# Patient Record
Sex: MALE | Race: WHITE | NOT HISPANIC OR LATINO | Employment: FULL TIME | URBAN - METROPOLITAN AREA
[De-identification: names, ages, dates, MRNs, and addresses within clinical notes are randomized per-mention and may not be internally consistent; named-entity substitution may affect disease eponyms.]

---

## 2018-05-23 ENCOUNTER — OFFICE VISIT - RIVER FALLS (OUTPATIENT)
Dept: FAMILY MEDICINE | Facility: CLINIC | Age: 28
End: 2018-05-23

## 2018-05-23 ASSESSMENT — MIFFLIN-ST. JEOR: SCORE: 1855.76

## 2020-03-17 ENCOUNTER — OFFICE VISIT - RIVER FALLS (OUTPATIENT)
Dept: FAMILY MEDICINE | Facility: CLINIC | Age: 30
End: 2020-03-17

## 2020-05-11 ENCOUNTER — COMMUNICATION - RIVER FALLS (OUTPATIENT)
Dept: FAMILY MEDICINE | Facility: CLINIC | Age: 30
End: 2020-05-11

## 2021-02-10 ENCOUNTER — OFFICE VISIT - RIVER FALLS (OUTPATIENT)
Dept: FAMILY MEDICINE | Facility: CLINIC | Age: 31
End: 2021-02-10

## 2021-04-01 ENCOUNTER — AMBULATORY - RIVER FALLS (OUTPATIENT)
Dept: FAMILY MEDICINE | Facility: CLINIC | Age: 31
End: 2021-04-01

## 2021-04-29 ENCOUNTER — AMBULATORY - RIVER FALLS (OUTPATIENT)
Dept: FAMILY MEDICINE | Facility: CLINIC | Age: 31
End: 2021-04-29

## 2021-11-02 ENCOUNTER — AMBULATORY - RIVER FALLS (OUTPATIENT)
Dept: FAMILY MEDICINE | Facility: CLINIC | Age: 31
End: 2021-11-02

## 2022-02-12 VITALS
RESPIRATION RATE: 16 BRPM | OXYGEN SATURATION: 98 % | HEIGHT: 70 IN | DIASTOLIC BLOOD PRESSURE: 60 MMHG | BODY MASS INDEX: 27.92 KG/M2 | SYSTOLIC BLOOD PRESSURE: 136 MMHG | WEIGHT: 195 LBS | TEMPERATURE: 98.1 F | HEART RATE: 60 BPM

## 2022-02-15 NOTE — NURSING NOTE
Asthma Control Test (ACT) Total Entered On:  2/10/2021 4:48 PM CST    Performed On:  2/10/2021 4:48 PM CST by Angela Agustin               Asthma Control Test (ACT) Total   Asthma Control Test Total (Adult) :   20    ACT- Asthma Action Plan Interventions :   Medications Explained, Exacerbation Information, Flu Shot   Angela Agustin - 2/10/2021 4:48 PM CST

## 2022-02-15 NOTE — NURSING NOTE
Comprehensive Intake Entered On:  2/10/2021 4:12 PM CST    Performed On:  2/10/2021 4:07 PM CST by Angela Agustin               Summary   Chief Complaint :   Asthma check up.  Verbal consent for video visit given.    Angela Agustin - 2/10/2021 4:07 PM CST   Health Status   Allergies Verified? :   Yes   Medication History Verified? :   Yes   Pre-Visit Planning Status :   Completed   Tobacco Use? :   Never smoker   Hospitalized since last visit? :   No   Inpatient? :   No   ED? :   No   Related to Condition :   Asthma   Angela Agustin - 2/10/2021 4:07 PM CST   ID Risk Screen   Recent Travel History :   No recent travel   Family Member Travel History :   No recent travel   Other Exposure to Infectious Disease :   Unknown   COVID-19 Testing Status :   No positive COVID-19 test   Angela Agustin - 2/10/2021 4:07 PM CST   Social History   Social History   (As Of: 2/10/2021 4:12:13 PM CST)   Alcohol:        Current, 2-3 times per week   Comments:  5/23/2018 6:40 PM - Kosta Triplett CMA: 1-3 drinks per time   (Last Updated: 5/23/2018 6:40:11 PM CDT by Kosta Triplett CMA)          Tobacco:        Never (less than 100 in lifetime)   (Last Updated: 2/10/2021 4:07:36 PM CST by Angela Agustin)          Electronic Cigarette/Vaping:        Electronic Cigarette Use: Never.   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   (Last Updated: 12/26/2016 11:24:12 AM CST by Ghulam Rogers MD)

## 2022-02-15 NOTE — TELEPHONE ENCOUNTER
---------------------  From: Christiana Arriaga LPN (eRx Pool (32224_[facility]))   To: SILKE Message Pool (32224_[facility]);     Sent: 5/11/2020 12:25:24 PM CDT  Subject: FW: Medication Management   Due Date/Time: 5/10/2020 9:32:00 AM CDT     Med Refill    Date of last office visit and reason: 03/17/20, telemed    Impression and Plan   Diagnosis     Mild persistent asthma (UIS85-GC J45.30).     Plan:  will treat with burst of prednisone to reduce inflammation, and will restart him on Advair and will renew his albuterol.      Date of last Med Check or Px:  05/23/18  Date of last labs pertaining to med:  ACT 05/23/18, Due    RTC order in chart:  _    For protocol refill, has patient been contacted?  _          ** Patient matched by Christiana Arriaga LPN on 5/11/2020 11:55:32 AM CDT **      ------------------------------------------  From: [facility] #[id number]  To: Amadou Montalvo PA-C  Sent: May 9, 2020 9:32:03 AM CDT  Subject: Medication Management  Due: May 10, 2020 9:32:03 AM CDT    ** On Hold Pending Signature **  Drug: albuterol (albuterol 90 mcg/inh inhalation aerosol)  INHALE 2 PUFFS BY MOUTH EVERY 4 TO 6 HOURS AS NEEDED FOR WHEEZING  Quantity: 8.5 gm  Days Supply: 16  Refills: 0  Substitutions Allowed  Notes from Pharmacy:     Dispensed Drug: albuterol (albuterol 90 mcg/inh inhalation aerosol)  INHALE 2 PUFFS BY MOUTH EVERY 4 TO 6 HOURS AS NEEDED FOR WHEEZING  Quantity: 8.5 gm  Days Supply: 16  Refills: 0  Substitutions Allowed  Notes from Pharmacy:   ---------------------------------------------------------------  From: Kosta Triplett CMA ([facility] Message Pool (32224_[facility]))   To: Amadou Montalvo PA-C;     Sent: 5/11/2020 12:39:17 PM CDT  Subject: FW: Medication Management   Due Date/Time: 5/10/2020 9:32:00 AM CDT---------------------  From: Amadou Montalvo PA-C   To: [facility] #[id number]    Sent: 5/11/2020 12:50:41 PM CDT  Subject: FW: Medication Management     ** Submitted:  **  Complete:albuterol (Ventolin HFA 90 mcg/inh inhalation aerosol)   Signed by Amadou Montalvo PA-C  5/11/2020 5:50:00 PM    ** Approved with modifications: **  albuterol (ALBUTEROL HFA INH (200 PUFFS) 8.5GM)  INHALE 2 PUFFS BY MOUTH EVERY 4 TO 6 HOURS AS NEEDED FOR WHEEZING  Qty:  8.5 gm        Days Supply:  16        Refills:  3          Substitutions Allowed     Route To Pharmacy - [facility] #[id number]

## 2022-02-15 NOTE — PROGRESS NOTES
Patient:   DORIE BURKS            MRN: [id number]            FIN: [id number]               Age:   28 years     Sex:  Male     :  1990   Associated Diagnoses:   Mild persistent asthma; Peanut allergy   Author:   Amadou Montalvo PA-C      Chief Complaint   2018 5:52 PM CDT    Pt here needing refills on his inhalers and epi pens        History of Present Illness   Chief complaint and symptoms noted above and confirmed with patient   he uses his albuterol inhaler maybe once every 2 weeks, except if he is around cats or increased pollen and   he may have to use it more  he has been on Asmanex in the past, but he had better success with Advair  ACT score is 20 today  he also has a peanut allergy and needs an epi pen         Review of Systems   Constitutional:  Negative.    Ear/Nose/Mouth/Throat:  Negative.    Respiratory:  Negative.    Gastrointestinal:  Negative.       Health Status   Allergies:    Allergic Reactions (Selected)  Severity Not Documented  Peanuts (Anaphylaxis)   Medications:  (Selected)   Prescriptions  Prescribed  EpiPen Auto-Injector 0.3 mg injectable kit: See Instructions, Instructions: USE AS DIRECTED FOR ANAPHYLAXIS  may repeat if necessary, # 2 EA, 1 Refill(s), Pharmacy: On license of [facility], USE AS DIRECTED FOR ANAPHYLAXIS; may repeat if necessary  Ventolin HFA 90 mcg/inh inhalation aerosol: 2 puff(s), inh, qid, Instructions: INHALE TWO PUFFS BY MOUTH EVERY 4 TO 6 HOURS AS NEEDED FOR WHEEZING, # 1 EA, 0 Refill(s), Type: Maintenance, Pharmacy: On license of [facility]   Problem list:    All Problems  Peanut allergy / SNOMED CT 399371949 / Confirmed  Asthma / SNOMED CT 720432648 / Confirmed      Histories   Past Medical History:    Active  Asthma ([id number])   Family History:    Asthma  Mother (Matilda)  Diabetes mellitus type I  Father (Chintan)  CA - Cancer of colon  Grandfather (P)  Miscellaneous  Grandmother (M) (unknown)  Comments:  2014 3:38 PM Owen Sanchez  Naheed  Anesthetic complications.     Procedure history:    Extraction of wisdom tooth ([id number]).      Physical Examination   Vital Signs   5/23/2018 5:52 PM CDT Temperature Tympanic 98.1 DegF    Peripheral Pulse Rate 60 bpm    Pulse Site Radial artery    Respiratory Rate 16 br/min    Systolic Blood Pressure 136 mmHg  HI    Diastolic Blood Pressure 60 mmHg    Mean Arterial Pressure 85 mmHg    BP Site Right arm    Oxygen Saturation 98 %      Measurements from flowsheet : Measurements   5/23/2018 5:52 PM CDT Height Measured - Standard 70 in    Weight Measured - Standard 195 lb    BSA 2.09 m2    Body Mass Index 27.98 kg/m2  HI      General:  No acute distress.    HENT:  Tympanic membranes are clear, No pharyngeal erythema, No sinus tenderness, nares are patent.    Neck:  Supple, Non-tender, No lymphadenopathy.    Respiratory:  Lungs are clear to auscultation.    Cardiovascular:  Normal rate, Regular rhythm, No murmur.    Psychiatric:  Appropriate mood & affect.       Impression and Plan   Diagnosis     Mild persistent asthma (DTV78-MT J45.30).     Peanut allergy (AOP29-MV Z91.010).     Orders     Orders   Pharmacy:  Asmanex Twisthaler 30 Dose 110 mcg/inh inhalation aerosol powder (Delete).     Orders   Pharmacy:  Advair Diskus 250 mcg-50 mcg inhalation powder (Prescribe): 1 puff(s), INH, BID, # 60 EA, 11 Refill(s), Type: Maintenance, Pharmacy: [facility], 1 puff(s) inh bid.     Orders   Pharmacy:  EPINEPHrine 0.3 mg injectable kit (Prescribe): 0.3 mL, im, once, # 1 kit(s), 0 Refill(s), Type: Soft Stop, Pharmacy: [facility], 0.3 mL im once  Ventolin HFA 90 mcg/inh inhalation aerosol (Prescribe): 2 puff(s), inh, qid, Instructions: INHALE TWO PUFFS BY MOUTH EVERY 4 TO 6 HOURS AS NEEDED FOR WHEEZING, # 2 EA, 8 Refill(s), Type: Maintenance, Pharmacy: [facility], 2 puff(s) inh qid,Instr:INHALE TWO PUFFS BY MOUTH EVERY 4 TO 6 HOURS AS NEEDED FOR  WHEEZING  Ventolin HFA 90 mcg/inh inhalation aerosol (Modify): 2 puff(s), inh, qid, Instructions: INHALE TWO PUFFS BY MOUTH EVERY 4 TO 6 HOURS AS NEEDED FOR WHEEZING, # 1 EA, 0 Refill(s), Type: Hard Stop, Pharmacy: [facility].     Orders   Charges (Evaluation and Management):  92774 office outpatient visit 15 minutes (Charge) (Order): Quantity: 1, Mild persistent asthma  Peanut allergy.

## 2022-02-15 NOTE — TELEPHONE ENCOUNTER
Entered by Kosta Triplett CMA on October 21, 2020 4:53:55 PM CDT  ---------------------  From: Kosta Triplett CMA   To: [facility] Rx [id number]    Sent: 10/21/2020 4:53:54 PM CDT  Subject: Medication Management     ** Submitted: **  Complete:albuterol (albuterol 90 mcg/inh inhalation aerosol)   Signed by Kosta Triplett CMA  10/21/2020 9:53:00 PM [facility]    ** Approved with modifications: **  albuterol (ALBUTEROL HFA INH(200 PUFFS)18GM)  INHALE 2 PUFFS BY MOUTH EVERY 4 TO 6 HOURS AS NEEDED FOR WHEEZING  Qty:  18 gm        Days Supply:  16        Refills:  1          Substitutions Allowed     Route To Pharmacy - [facility] Rx [id number]   Signed by Kosta Triplett CMA            ------------------------------------------  From: [facility] Rx [id number]  To: Amadou Montalvo PA-C  Sent: October 21, 2020 12:55:52 PM CDT  Subject: Medication Management  Due: October 8, 2020 2:04:31 PM CDT     ** On Hold Pending Signature **     Dispensed Drug: albuterol (albuterol 90 mcg/inh inhalation aerosol), INHALE 2 PUFFS BY MOUTH EVERY 4 TO 6 HOURS AS NEEDED FOR WHEEZING  Quantity: 18 gm  Days Supply: 16  Refills: 0  Substitutions Allowed  Notes from Pharmacy:  ------------------------------------------Med Refill      Date of last office visit and reason:  3/17/20 asthma telemedicine      Date of last Med Check / Px:     Date of last labs pertaining to med:      Note:  Rx filled per protocol.  Kosta Triplett CMA     RTC order in chart:  no    For Protocol refill, has patient been contacted:  _

## 2022-02-15 NOTE — TELEPHONE ENCOUNTER
Entered by Cristela Tolentino CMA on December 07, 2020 10:29:42 AM CST  ---------------------  From: Cristela Tolentino CMA   To: [facility] Rx [id number]    Sent: 12/7/2020 10:29:42 AM CST  Subject: Medication Management     ** Submitted: **  Order:albuterol (Albuterol (Eqv-ProAir HFA) 90 mcg/inh inhalation aerosol)  2 puff(s)  Inhale  q6 hrs  Qty:  1 EA        Refills:  0          Substitutions Allowed     PRN  for shortness of breath or wheezing      Route To Pharmacy [facility] Rx [id number]    Signed by Cristela Tolentino CMA  12/7/2020 4:28:00 PM [facility]    ** Submitted: **  Complete:albuterol (albuterol 90 mcg/inh inhalation aerosol)   Signed by Cristela Tolentino CMA  12/7/2020 4:29:00 PM [facility]    ** Not Approved:  **  albuterol (ALBUTEROL HFA INH (200 PUFFS)8.5GM)  INHALE 2 PUFFS BY MOUTH EVERY 4 TO 6 HOURS AS NEEDED FOR WHEEZING  Qty:  8.5 gm        Days Supply:  16        Refills:  0          Substitutions Allowed     Route To [facility] Rx [id number]   Signed by Cristela Tolentino CMA            ------------------------------------------  From: [facility] Rx [id number]  To: Amadou Montalvo PA-C  Sent: December 3, 2020 9:58:49 AM CST  Subject: Medication Management  Due: December 2, 2020 4:43:08 PM CST     ** On Hold Pending Signature **     Dispensed Drug: albuterol (Albuterol (Eqv-ProAir HFA) 90 mcg/inh inhalation aerosol), INHALE 2 PUFFS BY MOUTH EVERY 4 TO 6 HOURS AS NEEDED FOR WHEEZING  Quantity: 8.5 gm  Days Supply: 16  Refills: 0  Substitutions Allowed  Notes from Pharmacy:  ------------------------------------------RTC placed for asthma f/u - last done 3/2020 - due for ACT. Refilled per protocol. Note also sent to pharmacy.

## 2022-02-15 NOTE — TELEPHONE ENCOUNTER
Entered by Sydnee Peterson on December 30, 2021 1:11:42 PM CST  ---------------------  From: Sydnee Peterson   To: [email or web address] #[id number]    Sent: 12/30/2021 1:11:41 PM CST  Subject: Medication Management     ** Submitted: **  Order:albuterol (Albuterol (Eqv-Proventil HFA) 90 mcg/inh inhalation aerosol)  See Instructions  INHALE 2 PUFFS BY MOUTH EVERY 6 HOURS AS NEEDED FOR SHORTNESS OF BREATH OR WHEEZING  Qty:  6.7 gm        Days Supply:  25        Refills:  2          Substitutions Allowed     Route To [email or web address] #[email or web address]    Signed by Sydnee Peterson  12/30/2021 7:10:00 PM [facility]    ** Submitted: **  Complete:albuterol (Albuterol (Eqv-Proventil HFA) 90 mcg/inh inhalation aerosol)   Signed by Sydnee Peterson  12/30/2021 7:11:00 PM [facility]    ** Not Approved:  **  albuterol (ALBUTEROL HFA INH (200 PUFFS)6.7GM)  INHALE 2 PUFFS BY MOUTH EVERY 6 HOURS AS NEEDED FOR SHORTNESS OF BREATH OR WHEEZING  Qty:  6.7 gm        Days Supply:  25        Refills:  0          Substitutions Allowed     Route To [email or web address] #[email or web address]   Signed by Sydnee Peterson          due for visit 2/2022, will give 3 EA until then.       ------------------------------------------  From: [email or web address] #[email or web address]  To: Chaz Chavez MD  Sent: December 27, 2021 7:32:45 PM CST  Subject: Medication Management  Due: December 15, 2021 8:09:06 PM CST     ** On Hold Pending Signature **     Dispensed Drug: albuterol (Albuterol (Eqv-Proventil HFA) 90 mcg/inh inhalation aerosol), INHALE 2 PUFFS BY MOUTH EVERY 6 HOURS AS NEEDED FOR SHORTNESS OF BREATH OR WHEEZING  Quantity: 6.7 gm  Days Supply: 25  Refills: 0  Substitutions Allowed  Notes from Pharmacy:  ------------------------------------------

## 2022-02-15 NOTE — LETTER
(Inserted Image. Unable to display)     January 08, 2021      DORIE BURKS  [address] [place] [address]          Dear DORIE,      Thank you for selecting [facility] (previously [facility]) for your healthcare needs.    Our records indicate you are due for the following services:     Asthma Follow-up Office Visit ~ Please bring in your inhalers/asthma medications that you are currently using to your visit.     (FYI   Regarding office visits: In some instances, a video visit or telephone visit may be offered as an option.)      To schedule an appointment or if you have further questions, please contact your clinic at [phone].      Powered by [employer]    Sincerely,    Amadou Montalvo PA-C

## 2022-02-15 NOTE — PROGRESS NOTES
Patient:   DORIE BURKS            MRN: [id number]            FIN: [id number]               Age:   29 years     Sex:  Male     :  1990   Associated Diagnoses:   Mild persistent asthma   Author:   Selvin HERNANDEZ, Amadou VERAS      Visit Information   Visit type:  Telephone Encounter.    Source of history:  Patient.    Location of patient:  home_  Call Start Time:   10:15_  Call End Time:    _10:25      Chief Complaint   worsening asthma since   only on albuterol now, had been on advair in the past bu t ran out in Dec      _      History of Present Illness   Today's visit was conducted via telephone due to the COVID-19 pandemic.     Reason for visit:  worsening asthma since   only on albuterol now, had been on advair in the past_  has been using his albuterol 2-3x/day, some wheezing and SOB, no cough, no recent travel      Review of Systems   Constitutional:  Negative.    Ear/Nose/Mouth/Throat:  No nasal congestion.    Respiratory:  Shortness of breath, Wheezing, No cough.    Gastrointestinal:  Negative.       Impression and Plan   Diagnosis     Mild persistent asthma (WGV06-RZ J45.30).     Plan:  will treat with burst of prednisone to reduce inflammation, and will restart him on Advair and will renew his albuterol.    Orders     Orders   Pharmacy:  predniSONE 20 mg oral tablet (Prescribe): = 2 tab(s) ( 40 mg ), Oral, daily, x 5 day(s), Instructions: for asthma flare  with food or milk, # 10 tab(s), 1 Refill(s), Type: Acute, Pharmacy: [facility], 2 tab(s) Oral daily,x5 day(s),Instr:for asthma flare; with food or milk  Ventolin HFA 90 mcg/inh inhalation aerosol (Prescribe): 2 puff(s), inh, qid, Instructions: INHALE TWO PUFFS BY MOUTH EVERY 4 TO 6 HOURS AS NEEDED FOR WHEEZING, # 2 EA, 8 Refill(s), Type: Maintenance, Pharmacy: [facility], 2 puff(s) Inhale qid,Instr:INHALE TWO PUFFS BY MOUTH EVERY 4 TO 6 HOURS AS NEEDED FOR WHEEZING  Advair Diskus 250 mcg-50 mcg  inhalation powder (Prescribe): 1 puff(s), INH, BID, # 3 EA, 3 Refill(s), Type: Maintenance, Pharmacy: [facility], 1 puff(s) Inhale bid  Ventolin HFA 90 mcg/inh inhalation aerosol (Modify): = 2 puff(s), inh, qid, Instructions: INHALE TWO PUFFS BY MOUTH EVERY 4 TO 6 HOURS AS NEEDED FOR WHEEZING, # 2 EA, 8 Refill(s), Type: Hard Stop, Pharmacy: [facility]  Advair Diskus 250 mcg-50 mcg inhalation powder (Modify): 1 puff(s), INH, BID, # 60 EA, 11 Refill(s), Type: Hard Stop, Pharmacy: [facility].     Orders   Charges (Evaluation and Management):  [id number] physician telephone evaluation 5-10 min (Charge) (Order): Quantity: 1, Mild persistent asthma.        Health Status   Allergies:    Allergic Reactions (Selected)  Severity Not Documented  Peanuts (Anaphylaxis)   Medications:  (Selected)   Prescriptions  Prescribed  Advair Diskus 250 mcg-50 mcg inhalation powder: 1 puff(s), INH, BID, # 60 EA, 11 Refill(s), Type: Maintenance, Pharmacy: [facility], 1 puff(s) inh bid  EPINEPHrine 0.3 mg injectable kit: 0.3 mL, im, once, # 1 kit(s), 0 Refill(s), Type: Soft Stop, Pharmacy: [facility], 0.3 mL im once  EpiPen Auto-Injector 0.3 mg injectable kit: See Instructions, Instructions: USE AS DIRECTED FOR ANAPHYLAXIS  may repeat if necessary, # 2 EA, 1 Refill(s), Pharmacy: [facility], USE AS DIRECTED FOR ANAPHYLAXIS; may repeat if necessary  Ventolin HFA 90 mcg/inh inhalation aerosol: 2 puff(s), inh, qid, Instructions: INHALE TWO PUFFS BY MOUTH EVERY 4 TO 6 HOURS AS NEEDED FOR WHEEZING, # 2 EA, 8 Refill(s), Type: Maintenance, Pharmacy: [facility], 2 puff(s) inh qid,Instr:INHALE TWO PUFFS BY MOUTH EVERY 4 TO...   Problem list:    All Problems  Peanut allergy / SNOMED CT 530587481 / Confirmed  Mild persistent asthma / SNOMED CT 5335749824 / Confirmed  Asthma / SNOMED CT  322204925 / Confirmed      Histories   Past Medical History:    Active  Asthma ([id number])   Family History:    Asthma  Mother (Matilda)  Diabetes mellitus type I  Father (Chintan)  CA - Cancer of colon  Grandfather (P)  Miscellaneous  Grandmother (M) (unknown)  Comments:  5/13/2014 3:38 PM CDT - Naheed Sanchez  Anesthetic complications.     Procedure history:    Extraction of wisdom tooth ([id number]).   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

## 2022-02-15 NOTE — TELEPHONE ENCOUNTER
Entered by Edy Hammer on September 23, 2019 7:43:10 PM CDT  ---------------------  From: Edy Hammer   To: [facility]    Sent: 9/23/2019 7:43:10 PM CDT  Subject: Medication Management     ** Not Approved: Patient needs appointment, Has not been seen over a year. Needs appointment prior. Thanks **  fluticasone-salmeterol (ADVAIR DISKUS 250-50MCG/DOSE AEPB)  INHALE ONE PUFF BY MOUTH TWICE A DAY  Qty:  60 unknown unit        Days Supply:  30        Refills:  11          Substitutions Allowed     Route To Pharmacy - [facility]   Signed by Edy Hammer            ------------------------------------------  From: [facility]  To: Amadou Montalvo PA-C  Sent: September 21, 2019 12:32:06 PM CDT  Subject: Medication Management  Due: September 22, 2019 12:32:06 PM CDT    ** On Hold Pending Signature **  Drug: fluticasone-salmeterol (Advair Diskus 250 mcg-50 mcg inhalation powder)  INHALE ONE PUFF BY MOUTH TWICE A DAY  Quantity: 60 unknown unit  Days Supply: 30        Refills: 11  Substitutions Allowed  Notes from Pharmacy:     Dispensed Drug: fluticasone-salmeterol (Advair Diskus 250 mcg-50 mcg inhalation powder)  INHALE ONE PUFF BY MOUTH TWICE A DAY  Quantity: 60 unknown unit  Days Supply: 30        Refills: 11  Substitutions Allowed  Notes from Pharmacy:   ------------------------------------------Denied request. Has not been seen since 5/2018.

## 2022-02-15 NOTE — TELEPHONE ENCOUNTER
Entered by Lilly Preston RN on July 29, 2019 4:14:42 PM CDT  ---------------------  From: Lilly Preston RN   To: [facility]    Sent: 7/29/2019 4:14:42 PM CDT  Subject: Medication Management     ** Not Approved: Patient needs appointment, Needs office visit **  albuterol (PROAIR HFA 108MCG/ACT AERS)  INHALE TWO PUFFS BY MOUTH EVERY 4 TO 6 HOURS AS NEEDED FOR WHEEZING  Qty:  17 gm        Days Supply:  1        Refills:  8          Substitutions Allowed     Route To Pharmacy - [facility]   Signed by Lilly Preston RN            ------------------------------------------  From: [facility]  To: Amadou Montalvo PA-C  Sent: July 27, 2019 9:51:25 AM CDT  Subject: Medication Management  Due: July 28, 2019 9:51:25 AM CDT    ** On Hold Pending Signature **  Drug: albuterol (Ventolin HFA 90 mcg/inh inhalation aerosol)  INHALE TWO PUFFS BY MOUTH EVERY 4 TO 6 HOURS AS NEEDED FOR WHEEZING  Quantity: 17 gm       Days Supply: 1         Refills: 8  Substitutions Allowed  Notes from Pharmacy:     Dispensed Drug: albuterol (ProAir HFA 90 mcg/inh inhalation aerosol)  INHALE TWO PUFFS BY MOUTH EVERY 4 TO 6 HOURS AS NEEDED FOR WHEEZING  Quantity: 17 gm       Days Supply: 1         Refills: 8  Substitutions Allowed  Notes from Pharmacy:   ------------------------------------------Patient has not been seen in over a year by any provider.  Needs office visit.

## 2022-02-15 NOTE — TELEPHONE ENCOUNTER
Entered by Selene Pittman CMA on October 27, 2021 7:22:56 AM CDT  ---------------------  From: Selene Pittman CMA   To: [facility] #[id number]    Sent: 10/27/2021 7:22:56 AM CDT  Subject: Medication Management     ** Submitted: **  Order:albuterol (Albuterol (Eqv-Proventil HFA) 90 mcg/inh inhalation aerosol)  See Instructions  INHALE 2 PUFFS BY MOUTH EVERY 6 HOURS AS NEEDED FOR SHORTNESS OF BREATH OR WHEEZING  Qty:  3 EA        Refills:  0          Substitutions Allowed     Route To [facility] #[id number]    Signed by Selene Pittman CMA  10/27/2021 12:22:00 PM [facility]    ** Submitted: **  Complete:albuterol (Albuterol (Eqv-ProAir HFA) 90 mcg/inh inhalation aerosol)   Signed by Selene Pittman CMA  10/27/2021 12:22:00 PM [facility]    ** Not Approved:  **  albuterol (ALBUTEROL HFA INH (200 PUFFS)6.7GM)  INHALE 2 PUFFS BY MOUTH EVERY 6 HOURS AS NEEDED FOR SHORTNESS OF BREATH OR WHEEZING  Qty:  6.7 gm        Days Supply:  25        Refills:  0          Substitutions Allowed     Route To [facility] #[id number]   Signed by Selene Pittman CMA            ------------------------------------------  From: [facility] #[id number]  To: Chaz Chavez MD  Sent: October 24, 2021 4:44:55 PM CDT  Subject: Medication Management  Due: October 21, 2021 8:18:07 PM CDT     ** On Hold Pending Signature **     Dispensed Drug: albuterol (Albuterol (Eqv-Proventil HFA) 90 mcg/inh inhalation aerosol), INHALE 2 PUFFS BY MOUTH EVERY 6 HOURS AS NEEDED FOR SHORTNESS OF BREATH OR WHEEZING  Quantity: 6.7 gm  Days Supply: 25  Refills: 0  Substitutions Allowed  Notes from Pharmacy:  ------------------------------------------RTC February for asthma med check

## 2022-04-15 RX ORDER — ALBUTEROL SULFATE 90 UG/1
2 AEROSOL, METERED RESPIRATORY (INHALATION) EVERY 6 HOURS PRN
Qty: 6.7 G | Refills: 0 | OUTPATIENT
Start: 2022-04-15

## 2022-04-20 RX ORDER — EPINEPHRINE 0.3 MG/.3ML
0.3 INJECTION SUBCUTANEOUS ONCE
COMMUNITY
Start: 2021-02-10

## 2022-04-20 RX ORDER — FLUTICASONE PROPIONATE AND SALMETEROL 250; 50 UG/1; UG/1
1 POWDER RESPIRATORY (INHALATION) 2 TIMES DAILY
COMMUNITY
Start: 2021-02-10 | End: 2022-04-25

## 2022-04-20 RX ORDER — ALBUTEROL SULFATE 90 UG/1
2 AEROSOL, METERED RESPIRATORY (INHALATION) EVERY 6 HOURS PRN
COMMUNITY
Start: 2021-12-30 | End: 2024-04-24

## 2022-04-22 ENCOUNTER — OFFICE VISIT (OUTPATIENT)
Dept: FAMILY MEDICINE | Facility: CLINIC | Age: 32
End: 2022-04-22
Payer: COMMERCIAL

## 2022-04-22 VITALS
BODY MASS INDEX: 27.6 KG/M2 | SYSTOLIC BLOOD PRESSURE: 120 MMHG | HEIGHT: 70 IN | OXYGEN SATURATION: 98 % | WEIGHT: 192.8 LBS | HEART RATE: 64 BPM | DIASTOLIC BLOOD PRESSURE: 80 MMHG | TEMPERATURE: 97.7 F

## 2022-04-22 DIAGNOSIS — Z91.010 PEANUT ALLERGY: ICD-10-CM

## 2022-04-22 DIAGNOSIS — H01.004 BLEPHARITIS OF LEFT UPPER EYELID, UNSPECIFIED TYPE: ICD-10-CM

## 2022-04-22 DIAGNOSIS — J45.30 MILD PERSISTENT ASTHMA WITHOUT COMPLICATION: ICD-10-CM

## 2022-04-22 DIAGNOSIS — T78.40XA ALLERGY, INITIAL ENCOUNTER: Primary | ICD-10-CM

## 2022-04-22 PROCEDURE — 99213 OFFICE O/P EST LOW 20 MIN: CPT | Performed by: FAMILY MEDICINE

## 2022-04-22 RX ORDER — BACITRACIN 500 [USP'U]/G
0.25 OINTMENT OPHTHALMIC 3 TIMES DAILY
Qty: 3.5 G | Refills: 1 | Status: SHIPPED | OUTPATIENT
Start: 2022-04-22 | End: 2023-11-30

## 2022-04-22 NOTE — PROGRESS NOTES
"  Assessment & Plan     Allergy, initial encounter  Patient with multiple allergies we will get him set up with allergist for skin testing and possible desensitization shots  - Adult Allergy/Asthma Referral; Future    Mild persistent asthma without complication  Overall doing well Advair once daily usually most of the year occasionally twice daily rare albuterol use mostly when he is around cats    Peanut allergy  No issues for several years he does have an EpiPen    Blepharitis of left upper eyelid, unspecified type  Discussed treatment options will look at bacitracin liquid or topical steroids look at warm compresses handout given               BMI:   Estimated body mass index is 27.66 kg/m  as calculated from the following:    Height as of this encounter: 1.778 m (5' 10\").    Weight as of this encounter: 87.5 kg (192 lb 12.8 oz).           Return in about 1 year (around 4/22/2023) for Follow up.    Chaz Chavez MD  [facility]    Victor Manuel Bateman is a 32 year old who presents for the following health issues   Patient overall is been doing well.  Advair daily rare use of albuterol primarily around cats no contact with any nuts.  He has had some chronic issues with his left upper eyelid for several weeks and gets a little red and swollen at times.  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  He plays hockey and works out.  His girlfriend is training for a half marathon and he runs with her.  History of Present Illness     Asthma:  He presents for follow up of asthma.  He has no cough, no wheezing, and no shortness of breath. He is using a relief medication a few times a week. He typically misses taking his controller medication 2 time(s) per week.Patient is aware of the following triggers: animal dander, cold air and exercise or sports. The patient has not had a visit to the Emergency Room, Urgent Care or Hospital due to asthma since the last clinic visit. " "    He eats 2-3 servings of fruits and vegetables daily.He consumes 1 sweetened beverage(s) daily.He exercises with enough effort to increase his heart rate 30 to 60 minutes per day.  He exercises with enough effort to increase his heart rate 4 days per week.   He is taking medications regularly.         Current Outpatient Medications   Medication     albuterol (PROAIR HFA/PROVENTIL HFA/VENTOLIN HFA) 108 (90 Base) MCG/ACT inhaler     EPINEPHrine (ANY BX GENERIC EQUIV) 0.3 MG/0.3ML injection 2-pack     fluticasone-salmeterol (ADVAIR) 250-50 MCG/DOSE inhaler     No current facility-administered medications for this visit.           Review of Systems   Constitutional, HEENT, cardiovascular, pulmonary, gi and gu systems are negative, except as otherwise noted.      Objective    /80 (BP Location: Right arm)   Pulse 64   Temp 97.7  F (36.5  C) (Tympanic)   Ht 1.778 m (5' 10\")   Wt 87.5 kg (192 lb 12.8 oz)   SpO2 98%   BMI 27.66 kg/m    Body mass index is 27.66 kg/m .  Physical Exam   GENERAL: healthy, alert and no distress  EYES: Blepharitis left upper lid  NECK: no adenopathy, no asymmetry, masses, or scars and thyroid normal to palpation  RESP: lungs clear to auscultation - no rales, rhonchi or wheezes  CV: regular rate and rhythm, normal S1 S2, no S3 or S4, no murmur, click or rub, no peripheral edema and peripheral pulses strong  ABDOMEN: soft, nontender, no hepatosplenomegaly, no masses and bowel sounds normal  MS: no gross musculoskeletal defects noted, no edema                "

## 2022-04-25 DIAGNOSIS — J45.30 MILD PERSISTENT ASTHMA WITHOUT COMPLICATION: Primary | ICD-10-CM

## 2022-04-25 RX ORDER — FLUTICASONE PROPIONATE AND SALMETEROL 250; 50 UG/1; UG/1
1 POWDER RESPIRATORY (INHALATION) 2 TIMES DAILY
Qty: 3 EACH | Refills: 3 | Status: SHIPPED | OUTPATIENT
Start: 2022-04-25 | End: 2024-04-25

## 2022-04-26 ENCOUNTER — TELEPHONE (OUTPATIENT)
Dept: FAMILY MEDICINE | Facility: CLINIC | Age: 32
End: 2022-04-26
Payer: COMMERCIAL

## 2022-04-26 DIAGNOSIS — J45.30 MILD PERSISTENT ASTHMA WITHOUT COMPLICATION: Primary | ICD-10-CM

## 2022-04-26 RX ORDER — ALBUTEROL SULFATE 90 UG/1
2 AEROSOL, METERED RESPIRATORY (INHALATION) EVERY 6 HOURS
Qty: 18 G | Refills: 3 | Status: SHIPPED | OUTPATIENT
Start: 2022-04-26 | End: 2022-10-26

## 2022-04-26 NOTE — TELEPHONE ENCOUNTER
Prescription approved per [facility] Refill Protocol.  Last Written Prescription Date: 12/30/21  Last Fill Quantity: 6.7gm,  # refills: 2   Last office visit: 4/22/2022 with prescribing provider for allergy, asthma TFS

## 2022-04-26 NOTE — TELEPHONE ENCOUNTER
Reason for Call:  Medication or medication refill:    Do you use a [facility]?  Name of the pharmacy and phone number for the current request:  [facility] on [address]    Name of the medication requested: [id number]     Other request: pharmacy never received please resend. Cost of advair is very pricey per patient. Insurance is requesting it be filled at a 90 day supply.     Can we leave a detailed message on this number? YES    Phone number patient can be reached at: Cell number on file:    Telephone Information:   Mobile [phone]       Best Time: anytime    Call taken on 4/26/2022 at 10:26 AM by Marcie Marcum

## 2022-04-27 RX ORDER — ERYTHROMYCIN 5 MG/G
0.5 OINTMENT OPHTHALMIC 2 TIMES DAILY
Qty: 3.5 G | Refills: 1 | Status: SHIPPED | OUTPATIENT
Start: 2022-04-27 | End: 2022-05-11

## 2022-07-14 ENCOUNTER — OFFICE VISIT (OUTPATIENT)
Dept: ALLERGY | Facility: CLINIC | Age: 32
End: 2022-07-14
Attending: FAMILY MEDICINE
Payer: COMMERCIAL

## 2022-07-14 VITALS — HEART RATE: 75 BPM | OXYGEN SATURATION: 97 % | BODY MASS INDEX: 27.66 KG/M2 | RESPIRATION RATE: 16 BRPM | HEIGHT: 70 IN

## 2022-07-14 DIAGNOSIS — J30.89 ALLERGIC RHINITIS DUE TO DUST MITE: ICD-10-CM

## 2022-07-14 DIAGNOSIS — J30.81 ALLERGIC RHINITIS DUE TO ANIMALS: ICD-10-CM

## 2022-07-14 DIAGNOSIS — Z91.018 TREE NUT ALLERGY: ICD-10-CM

## 2022-07-14 DIAGNOSIS — J30.1 SEASONAL ALLERGIC RHINITIS DUE TO POLLEN: Primary | ICD-10-CM

## 2022-07-14 DIAGNOSIS — T78.40XA ALLERGY, INITIAL ENCOUNTER: ICD-10-CM

## 2022-07-14 DIAGNOSIS — Z91.010 PEANUT ALLERGY: ICD-10-CM

## 2022-07-14 PROCEDURE — 95004 PERQ TESTS W/ALRGNC XTRCS: CPT | Performed by: ALLERGY & IMMUNOLOGY

## 2022-07-14 PROCEDURE — 99244 OFF/OP CNSLTJ NEW/EST MOD 40: CPT | Mod: 25 | Performed by: ALLERGY & IMMUNOLOGY

## 2022-07-14 RX ORDER — MONTELUKAST SODIUM 10 MG/1
10 TABLET ORAL AT BEDTIME
Qty: 30 TABLET | Refills: 3 | Status: SHIPPED | OUTPATIENT
Start: 2022-07-14 | End: 2023-11-30

## 2022-07-14 ASSESSMENT — ASTHMA QUESTIONNAIRES
QUESTION_3 LAST FOUR WEEKS HOW OFTEN DID YOUR ASTHMA SYMPTOMS (WHEEZING, COUGHING, SHORTNESS OF BREATH, CHEST TIGHTNESS OR PAIN) WAKE YOU UP AT NIGHT OR EARLIER THAN USUAL IN THE MORNING: ONCE OR TWICE
ACT_TOTALSCORE: 18
QUESTION_5 LAST FOUR WEEKS HOW WOULD YOU RATE YOUR ASTHMA CONTROL: SOMEWHAT CONTROLLED
ACT_TOTALSCORE: 18
QUESTION_4 LAST FOUR WEEKS HOW OFTEN HAVE YOU USED YOUR RESCUE INHALER OR NEBULIZER MEDICATION (SUCH AS ALBUTEROL): TWO OR THREE TIMES PER WEEK
ACUTE_EXACERBATION_TODAY: NO
QUESTION_1 LAST FOUR WEEKS HOW MUCH OF THE TIME DID YOUR ASTHMA KEEP YOU FROM GETTING AS MUCH DONE AT WORK, SCHOOL OR AT HOME: NONE OF THE TIME
QUESTION_2 LAST FOUR WEEKS HOW OFTEN HAVE YOU HAD SHORTNESS OF BREATH: THREE TO SIX TIMES A WEEK

## 2022-07-14 NOTE — PATIENT INSTRUCTIONS
Check component testing to hazelnut    [facility] challenge    AM appt, no breakfast, bring almonds with you, healthy 2-3 hours    Dust mite control    Options:  Astelin nasal spray, montelukast, allergy shots    Air purifier    Keep dog well groomed

## 2022-07-14 NOTE — LETTER
ANAPHYLAXIS ALLERGY PLAN    Name: Fransico Arellano      :  1990    Allergy to:  Peanut, tree nut    Weight: 0 lbs 0 oz           Asthma:  Yes  (higher risk for a severe reaction)      Do not depend on antihistamines or inhalers (bronchodilators) to treat a severe reaction; USE EPINEPHRINE      MEDICATIONS/DOSES  Epinephrine:    Epinephrine dose:  0.3 mg IM  Antihistamine:  Zyrtec (Cetirizine)  Antihistamine dose:  10 mg   Other (e.g., inhaler-bronchodilator if wheezing):  Albuterol 2 puffs       ANAPHYLAXIS ALLERGY PLAN (Page 2)  Patient:  Fransico Arellano  :  1990         Electronically signed on 2022 by:  Liyah MARTINS MD  Parent/Guardian Authorization Signature:  ___________________________ Date:    FORM PROVIDED [employer]UC[employer] (FARE) ([email or web address]) 2017

## 2022-07-14 NOTE — PROGRESS NOTES
"      Victor Manuel Bateman is a 32 year old, presenting for the following health issues:  Allergy Consult      HPI     Chief complaint: Allergies    History of present illness: This is a pleasant 32-year-old gentleman I was asked to see for evaluation by Dr. Chavez in regards to allergies.  Patient states at the age of 6 he had an allergic reaction to peanuts.  He states since that time he is avoided all peanuts and tree nuts but has eaten some almond in the form of honey nut Cheerios and done well.  He had an allergic reaction to peanut and where he touched the peanut and his eyes swelled.  He does carry an epinephrine device.  He has not been retested for food allergy since he was young.    He also has a history of allergic rhinitis especially during the spring and fall or if he is around animals such as cats.  He states over time the develops a tolerance to dogs.  Symptoms will consist of itchy, watery eyes, runny nose and sneezing as well as drainage.  He takes a daily Claritin which helps but does not completely alleviate symptoms.  Asthma is typically triggered by allergies.  He was hospitalized for asthma and pneumonia when he was very young.  He states he takes Advair 250-50 1 puff daily and sometimes increases it to twice daily especially if he is around cats or any mold is bothering him.  ACT score today is 18.  He does need his inhaler with exercise sometimes especially if he goes from hot to cold.  He plays hockey frequently.    Past medical history: Otherwise unremarkable    Social history: He does have a dog, non-smoker, works in sales, lives in a home with central air and a basement     family history: Negative for asthma and allergies              Review of Systems   Constitutional, HEENT, cardiovascular, pulmonary, GI, , musculoskeletal, neuro, skin, endocrine and psych systems are negative, except as otherwise noted.      Objective    Pulse 75   Resp 16   Ht 1.778 m (5' 10\")   SpO2 97%   " BMI 27.66 kg/m    Body mass index is 27.66 kg/m .  Physical Exam   Gen: Pleasant male not in acute distress  HEENT: Eyes no erythema of the bulbar or palpebral conjunctiva, no edema. Ears: No deformities or lesions nose: No congestion, mucosa normal. Mouth: Throat clear, no lip or tongue edema.   Neck: No masses lesions or swelling  Respiratory: Clear to auscultation bilaterally, no adventitious breath sounds  Lymph: No visible supraclavicular or cervical lymphadenopathy  Skin: No rashes or lesions  Psych: Alert and oriented times 3      At today's visit the patient/ and I engaged in an informed consent discussion about allergy testing.  We discussed skin testing, blood testing,  and the alternative of not undergoing any testing. The patient/ has a preference for skin testing. We then discussed the risks and benefits of skin testing.  The patient understands skin testing risks can include, but are not limited to, urticaria, angioedema, shortness of breath, and severe anaphylaxis.  The benefits include, but are not limited, to evaluation for allergens causing symptoms.  After answering the patients questions they have agreed to proceed with skin testing.        38 percutaneous test were undertaken today environmental skin test panel, peanut and tree nut.  Positive histamine control with positive test to tree pollen, weed pollen, cockroach, dust mites, cat and dog.  Positive test to peanut at 20 mm, hazelnut 5 mm of almond at 5 mm.  Please see scanned photograph.    Impression report and plan:  1.  Allergic rhinitis  2.  Asthma    Reviewed environmental control.  Suggested a month trial of montelukast.  Cautioned him to the rare side effect of mood disturbance.  Continue with his Claritin.  Continue Advair at current dosing but increase to twice daily with increased symptoms.  I went over the risks and benefits of allergy shots.  I stated risks include hives, swelling, shortness of breath.  I did state that one in 2.5  million shot administrations can result in death.  I stated they must wait in the office for 30 minutes following the shot and carry an epinephrine device on the day of the shot.  I stated that shots are effective in about 90% of patients.  I stated that they should check with the insurance company prior to proceeding.  They understand the risks and benefits and will let me know if he would like to proceed.  He has a current epinephrine device due to his food allergy.  Both consent forms were signed and scanned into the record.    3.  Peanut allergy  4.  Possible tree nut allergy    I suspect his small positives to almond and hazelnut are secondary to his birch tree allergy.  He does have some symptoms consistent with oral allergy syndrome.  Recommended in office challenge to almonds.  I did review a food allergy action plan with him and recommended the Auvi-Q device.  Recommended component testing for hazelnut in the future.                .  ..

## 2022-07-14 NOTE — LETTER
7/14/2022         RE: Fransico Arellano  [address] [address]        Dear Colleague,    Thank you for referring your patient, Fransico Arellano, to the [facility]. Please see a copy of my visit note below.          Subjective   Fransico is a 32 year old, presenting for the following health issues:  Allergy Consult      HPI     Chief complaint: Allergies    History of present illness: This is a pleasant 32-year-old gentleman I was asked to see for evaluation by Dr. Chavez in regards to allergies.  Patient states at the age of 6 he had an allergic reaction to peanuts.  He states since that time he is avoided all peanuts and tree nuts but has eaten some almond in the form of honey nut Cheerios and done well.  He had an allergic reaction to peanut and where he touched the peanut and his eyes swelled.  He does carry an epinephrine device.  He has not been retested for food allergy since he was young.    He also has a history of allergic rhinitis especially during the spring and fall or if he is around animals such as cats.  He states over time the develops a tolerance to dogs.  Symptoms will consist of itchy, watery eyes, runny nose and sneezing as well as drainage.  He takes a daily Claritin which helps but does not completely alleviate symptoms.  Asthma is typically triggered by allergies.  He was hospitalized for asthma and pneumonia when he was very young.  He states he takes Advair 250-50 1 puff daily and sometimes increases it to twice daily especially if he is around cats or any mold is bothering him.  ACT score today is 18.  He does need his inhaler with exercise sometimes especially if he goes from hot to cold.  He plays hockey frequently.    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      Objective    Pulse 75   Resp 16   Ht 1.778 m (5' 10\")   SpO2 97%   BMI 27.66 kg/m    Body mass index is 27.66 kg/m .  Physical Exam   Gen: Pleasant male not in acute distress  HEENT: Eyes no erythema of the bulbar or palpebral conjunctiva, no edema. Ears: No deformities or lesions nose: No congestion, mucosa normal. Mouth: Throat clear, no lip or tongue edema.   Neck: No masses lesions or swelling  Respiratory: Clear to auscultation bilaterally, no adventitious breath sounds  Lymph: No visible supraclavicular or cervical lymphadenopathy  Skin: No rashes or lesions  Psych: Alert and oriented times 3    38 percutaneous test were undertaken today environmental skin test panel, peanut and tree nut.  Positive histamine control with positive test to tree pollen, weed pollen, cockroach, dust mites, cat and dog.  Positive test to peanut at 20 mm, hazelnut 5 mm of almond at 5 mm.  Please see scanned photograph.    Impression report and plan:  1.  Allergic rhinitis  2.  Asthma    Reviewed environmental control.  Suggested a month trial of montelukast.  Cautioned him to the rare side effect of mood disturbance.  Continue with his Claritin.  Continue Advair at current dosing but increase to twice daily with increased symptoms.  I went over the risks and benefits of allergy shots.  I stated risks include hives, swelling, shortness of breath.  I did state that one in 2.5 million shot administrations can result in death.  I stated they must wait in the office for 30 minutes following the shot and carry an epinephrine device on the day of the shot.  I stated that shots are effective in about 90% of patients.  I stated that they should check with the insurance company prior to proceeding.  They understand the risks and benefits " and will let me know if he would like to proceed.  He has a current epinephrine device due to his food allergy.  Both consent forms were signed and scanned into the record.    3.  Peanut allergy  4.  Possible tree nut allergy    I suspect his small positives to almond and hazelnut are secondary to his birch tree allergy.  He does have some symptoms consistent with oral allergy syndrome.  Recommended in office challenge to almonds.  I did review a food allergy action plan with him and recommended the Auvi-Q device.  Recommended component testing for hazelnut in the future.                .  ..      Again, thank you for allowing me to participate in the care of your patient.        Sincerely,        Liyah MARTINS MD

## 2022-10-24 DIAGNOSIS — J45.30 MILD PERSISTENT ASTHMA WITHOUT COMPLICATION: ICD-10-CM

## 2022-10-26 RX ORDER — ALBUTEROL SULFATE 90 UG/1
AEROSOL, METERED RESPIRATORY (INHALATION)
Qty: 18 G | Refills: 3 | Status: SHIPPED | OUTPATIENT
Start: 2022-10-26 | End: 2023-04-12

## 2022-10-26 NOTE — TELEPHONE ENCOUNTER
Last Written Prescription Date:  4/26/22  Last Fill Quantity: 18 g,  # refills: 3   Last office visit: 4/22/2022 with prescribing provider:  Asthma/Allergy   Future Office Visit:  Due April 2023      Routing refill request to provider for review/approval because:      Approval needed from provider. Saw DR. TIMUR ESPINO on 4/22/22 but saw Dr. Syed at [facility] on 7/14/22.    Has no listed PCP.    Please advise.    Lilly Preston RN

## 2023-04-10 DIAGNOSIS — J45.30 MILD PERSISTENT ASTHMA WITHOUT COMPLICATION: ICD-10-CM

## 2023-04-12 RX ORDER — ALBUTEROL SULFATE 90 UG/1
AEROSOL, METERED RESPIRATORY (INHALATION)
Qty: 20.1 G | Refills: 4 | Status: SHIPPED | OUTPATIENT
Start: 2023-04-12

## 2023-04-12 NOTE — TELEPHONE ENCOUNTER
"    Last office visit: 4/22/2022     Future Appointments 4/12/2023 - 10/9/2023    None            7/14/2022     8:28 AM   ACT Total Scores   ACT TOTAL SCORE (Goal Greater than or Equal to 20) 18   In the past 12 months, how many times did you visit the emergency room for your asthma without being admitted to the hospital? 0   In the past 12 months, how many times were you hospitalized overnight because of your asthma? 0     Requested Prescriptions   Pending Prescriptions Disp Refills     albuterol (PROAIR HFA/PROVENTIL HFA/VENTOLIN HFA) 108 (90 Base) MCG/ACT inhaler [Pharmacy Med Name: ALBUTEROL HFA INH (200 PUFFS) 6.7GM] 20.1 g      Sig: INHALE 2 PUFFS INTO THE LUNGS EVERY 6 HOURS       Asthma Maintenance Inhalers - Anticholinergics Failed - 4/10/2023  9:52 PM        Failed - Asthma control assessment score within normal limits in last 6 months     Please review ACT score.           Failed - Recent (6 mo) or future (30 days) visit within the authorizing provider's specialty     Patient had office visit in the last 6 months or has a visit in the next 30 days with authorizing provider or within the authorizing provider's specialty.  See \"Patient Info\" tab in inbasket, or \"Choose Columns\" in Meds & Orders section of the refill encounter.            Passed - Patient is age 12 years or older        Passed - Medication is active on med list       Short-Acting Beta Agonist Inhalers Protocol  Failed - 4/10/2023  9:52 PM        Failed - Asthma control assessment score within normal limits in last 6 months     Please review ACT score.           Failed - Recent (6 mo) or future (30 days) visit within the authorizing provider's specialty     Patient had office visit in the last 6 months or has a visit in the next 30 days with authorizing provider or within the authorizing provider's specialty.  See \"Patient Info\" tab in inbasket, or \"Choose Columns\" in Meds & Orders section of the refill encounter.            Passed - Patient " is age 12 or older        Passed - Medication is active on med list

## 2023-11-30 ENCOUNTER — OFFICE VISIT (OUTPATIENT)
Dept: FAMILY MEDICINE | Facility: CLINIC | Age: 33
End: 2023-11-30
Payer: COMMERCIAL

## 2023-11-30 VITALS
RESPIRATION RATE: 16 BRPM | OXYGEN SATURATION: 99 % | HEART RATE: 54 BPM | WEIGHT: 193.4 LBS | BODY MASS INDEX: 27.07 KG/M2 | TEMPERATURE: 97.3 F | DIASTOLIC BLOOD PRESSURE: 73 MMHG | HEIGHT: 71 IN | SYSTOLIC BLOOD PRESSURE: 123 MMHG

## 2023-11-30 DIAGNOSIS — Z00.8 ENCOUNTER FOR BIOMETRIC SCREENING: Primary | ICD-10-CM

## 2023-11-30 DIAGNOSIS — Z00.00 HEALTHCARE MAINTENANCE: ICD-10-CM

## 2023-11-30 PROBLEM — J45.30 MILD PERSISTENT ASTHMA: Status: ACTIVE | Noted: 2023-11-30

## 2023-11-30 LAB
CHOLEST SERPL-MCNC: 247 MG/DL
FASTING STATUS PATIENT QL REPORTED: YES
GLUCOSE SERPL-MCNC: 93 MG/DL (ref 70–99)
HDLC SERPL-MCNC: 79 MG/DL
LDLC SERPL CALC-MCNC: 155 MG/DL
NONHDLC SERPL-MCNC: 168 MG/DL
TRIGL SERPL-MCNC: 66 MG/DL

## 2023-11-30 PROCEDURE — 80061 LIPID PANEL: CPT | Performed by: FAMILY MEDICINE

## 2023-11-30 PROCEDURE — 99212 OFFICE O/P EST SF 10 MIN: CPT | Performed by: FAMILY MEDICINE

## 2023-11-30 PROCEDURE — 82947 ASSAY GLUCOSE BLOOD QUANT: CPT | Mod: QW | Performed by: FAMILY MEDICINE

## 2023-11-30 PROCEDURE — 36415 COLL VENOUS BLD VENIPUNCTURE: CPT | Performed by: FAMILY MEDICINE

## 2023-11-30 ASSESSMENT — ASTHMA QUESTIONNAIRES
QUESTION_2 LAST FOUR WEEKS HOW OFTEN HAVE YOU HAD SHORTNESS OF BREATH: ONCE OR TWICE A WEEK
QUESTION_3 LAST FOUR WEEKS HOW OFTEN DID YOUR ASTHMA SYMPTOMS (WHEEZING, COUGHING, SHORTNESS OF BREATH, CHEST TIGHTNESS OR PAIN) WAKE YOU UP AT NIGHT OR EARLIER THAN USUAL IN THE MORNING: ONCE OR TWICE
ACT_TOTALSCORE: 19
QUESTION_4 LAST FOUR WEEKS HOW OFTEN HAVE YOU USED YOUR RESCUE INHALER OR NEBULIZER MEDICATION (SUCH AS ALBUTEROL): TWO OR THREE TIMES PER WEEK
ACT_TOTALSCORE: 19
QUESTION_1 LAST FOUR WEEKS HOW MUCH OF THE TIME DID YOUR ASTHMA KEEP YOU FROM GETTING AS MUCH DONE AT WORK, SCHOOL OR AT HOME: NONE OF THE TIME
QUESTION_5 LAST FOUR WEEKS HOW WOULD YOU RATE YOUR ASTHMA CONTROL: SOMEWHAT CONTROLLED

## 2023-11-30 NOTE — LETTER
December 1, 2023      Fransico Arellano  [address] [address]        Dear ,    We are writing to inform you of your test results. Your cholesterol levels are a bit high.  A heart healthy diet and regular activity will help bring the numbers down.    Resulted Orders   Glucose   Result Value Ref Range    Glucose 93 70 - 99 mg/dL    Patient Fasting > 8hrs? Yes    Lipid panel reflex to direct LDL Fasting   Result Value Ref Range    Cholesterol 247 (H) <200 mg/dL    Triglycerides 66 <150 mg/dL    Direct Measure HDL 79 >=40 mg/dL    LDL Cholesterol Calculated 155 (H) <=100 mg/dL    Non HDL Cholesterol 168 (H) <130 mg/dL    Narrative    Cholesterol  Desirable:  <200 mg/dL    Triglycerides  Normal:  Less than 150 mg/dL  Borderline High:  150-199 mg/dL  High:  200-499 mg/dL  Very High:  Greater than or equal to 500 mg/dL    Direct Measure HDL  Female:  Greater than or equal to 50 mg/dL   Male:  Greater than or equal to 40 mg/dL    LDL Cholesterol  Desirable:  <100mg/dL  Above Desirable:  100-129 mg/dL   Borderline High:  130-159 mg/dL   High:  160-189 mg/dL   Very High:  >= 190 mg/dL    Non HDL Cholesterol  Desirable:  130 mg/dL  Above Desirable:  130-159 mg/dL  Borderline High:  160-189 mg/dL  High:  190-219 mg/dL  Very High:  Greater than or equal to 220 mg/dL       If you have any questions or concerns, please call the clinic at the number listed above.       Sincerely,      King Leon MD

## 2023-11-30 NOTE — PROGRESS NOTES
"  Assessment & Plan     Encounter for biometric screening  Healthcare maintenance  We have discussed health maintenance, vaccinations, healthy diet and activity  Labs will be done and form will be sent.  - Glucose; Future  - Lipid panel reflex to direct LDL Fasting; Future               BMI:   Estimated body mass index is 27.36 kg/m  as calculated from the following:    Height as of this encounter: 1.791 m (5' 10.5\").    Weight as of this encounter: 87.7 kg (193 lb 6.4 oz).           King Leon MD  [facility]    Victor Manuel Bateman is a 33 year old, presenting for the following health issues:  Physical (Needs general biometrics screening for insurance, is fasting for labs)        11/30/2023     2:58 PM   Additional Questions   Roomed by Ursula SALDANA CMA   Accompanied by Self       History of Present Illness       Reason for visit:  I need a basic physical in order to recieve a healtj insurance discount    He eats 2-3 servings of fruits and vegetables daily.He consumes 0 sweetened beverage(s) daily.He exercises with enough effort to increase his heart rate 30 to 60 minutes per day.  He exercises with enough effort to increase his heart rate 4 days per week.   He is taking medications regularly.     Patient is here for exam and labs for biometric screening.  He is currently in good health.  He has seasonal allergies and mild persistent asthma which are well controlled.              Review of Systems   Constitutional, HEENT, cardiovascular, pulmonary, GI, , musculoskeletal, neuro, skin, endocrine and psych systems are negative, except as otherwise noted.      Objective    /73 (BP Location: Right arm, Patient Position: Sitting, Cuff Size: Adult Large)   Pulse 54   Temp 97.3  F (36.3  C) (Tympanic)   Resp 16   Ht 1.791 m (5' 10.5\")   Wt 87.7 kg (193 lb 6.4 oz)   SpO2 99%   BMI 27.36 kg/m    Body mass index is 27.36 kg/m .  Physical Exam   GENERAL: healthy, alert and no " distress  EYES: Eyes grossly normal to inspection, PERRL and conjunctivae and sclerae normal  HENT: ear canals and TM's normal, nose and mouth without ulcers or lesions  NECK: no adenopathy, no asymmetry, masses, or scars and thyroid normal to palpation  RESP: lungs clear to auscultation - no rales, rhonchi or wheezes  CV: regular rate and rhythm, normal S1 S2, no S3 or S4, no murmur, click or rub, no peripheral edema and peripheral pulses strong  ABDOMEN: soft, nontender, no hepatosplenomegaly, no masses and bowel sounds normal  MS: no gross musculoskeletal defects noted, no edema  SKIN: no suspicious lesions or rashes  NEURO: Normal strength and tone, mentation intact and speech normal  PSYCH: mentation appears normal, affect normal/bright

## 2024-04-24 DIAGNOSIS — J45.30 MILD PERSISTENT ASTHMA WITHOUT COMPLICATION: Primary | ICD-10-CM

## 2024-04-24 RX ORDER — ALBUTEROL SULFATE 90 UG/1
2 AEROSOL, METERED RESPIRATORY (INHALATION) EVERY 6 HOURS PRN
Qty: 18 G | Refills: 1 | Status: SHIPPED | OUTPATIENT
Start: 2024-04-24 | End: 2024-07-07

## 2024-04-25 DIAGNOSIS — J45.30 MILD PERSISTENT ASTHMA WITHOUT COMPLICATION: ICD-10-CM

## 2024-04-25 RX ORDER — FLUTICASONE PROPIONATE AND SALMETEROL 250; 50 UG/1; UG/1
1 POWDER RESPIRATORY (INHALATION) 2 TIMES DAILY
Qty: 3 EACH | Refills: 3 | Status: SHIPPED | OUTPATIENT
Start: 2024-04-25

## 2024-07-05 DIAGNOSIS — J45.30 MILD PERSISTENT ASTHMA WITHOUT COMPLICATION: ICD-10-CM

## 2024-07-07 RX ORDER — ALBUTEROL SULFATE 90 UG/1
2 AEROSOL, METERED RESPIRATORY (INHALATION) EVERY 6 HOURS PRN
Qty: 18 G | Refills: 1 | Status: SHIPPED | OUTPATIENT
Start: 2024-07-07

## 2024-11-12 SDOH — HEALTH STABILITY: PHYSICAL HEALTH: ON AVERAGE, HOW MANY DAYS PER WEEK DO YOU ENGAGE IN MODERATE TO STRENUOUS EXERCISE (LIKE A BRISK WALK)?: 5 DAYS

## 2024-11-12 SDOH — HEALTH STABILITY: PHYSICAL HEALTH: ON AVERAGE, HOW MANY MINUTES DO YOU ENGAGE IN EXERCISE AT THIS LEVEL?: 60 MIN

## 2024-11-12 ASSESSMENT — ASTHMA QUESTIONNAIRES
ACT_TOTALSCORE: 20
QUESTION_3 LAST FOUR WEEKS HOW OFTEN DID YOUR ASTHMA SYMPTOMS (WHEEZING, COUGHING, SHORTNESS OF BREATH, CHEST TIGHTNESS OR PAIN) WAKE YOU UP AT NIGHT OR EARLIER THAN USUAL IN THE MORNING: ONCE OR TWICE
QUESTION_2 LAST FOUR WEEKS HOW OFTEN HAVE YOU HAD SHORTNESS OF BREATH: ONCE OR TWICE A WEEK
ACT_TOTALSCORE: 20
QUESTION_1 LAST FOUR WEEKS HOW MUCH OF THE TIME DID YOUR ASTHMA KEEP YOU FROM GETTING AS MUCH DONE AT WORK, SCHOOL OR AT HOME: NONE OF THE TIME
QUESTION_4 LAST FOUR WEEKS HOW OFTEN HAVE YOU USED YOUR RESCUE INHALER OR NEBULIZER MEDICATION (SUCH AS ALBUTEROL): TWO OR THREE TIMES PER WEEK
QUESTION_5 LAST FOUR WEEKS HOW WOULD YOU RATE YOUR ASTHMA CONTROL: WELL CONTROLLED

## 2024-11-12 ASSESSMENT — SOCIAL DETERMINANTS OF HEALTH (SDOH): HOW OFTEN DO YOU GET TOGETHER WITH FRIENDS OR RELATIVES?: TWICE A WEEK

## 2024-11-13 ENCOUNTER — OFFICE VISIT (OUTPATIENT)
Dept: FAMILY MEDICINE | Facility: CLINIC | Age: 34
End: 2024-11-13
Payer: COMMERCIAL

## 2024-11-13 VITALS
SYSTOLIC BLOOD PRESSURE: 125 MMHG | WEIGHT: 196.9 LBS | HEIGHT: 71 IN | RESPIRATION RATE: 16 BRPM | TEMPERATURE: 96.9 F | BODY MASS INDEX: 27.56 KG/M2 | DIASTOLIC BLOOD PRESSURE: 76 MMHG | OXYGEN SATURATION: 98 % | HEART RATE: 58 BPM

## 2024-11-13 DIAGNOSIS — Z23 NEED FOR VACCINATION: ICD-10-CM

## 2024-11-13 DIAGNOSIS — Z13.1 SCREENING FOR DIABETES MELLITUS: ICD-10-CM

## 2024-11-13 DIAGNOSIS — Z00.8 ENCOUNTER FOR BIOMETRIC SCREENING: ICD-10-CM

## 2024-11-13 DIAGNOSIS — Z13.220 LIPID SCREENING: ICD-10-CM

## 2024-11-13 DIAGNOSIS — Z00.00 HEALTHCARE MAINTENANCE: Primary | ICD-10-CM

## 2024-11-13 DIAGNOSIS — J45.30 MILD PERSISTENT ASTHMA WITHOUT COMPLICATION: ICD-10-CM

## 2024-11-13 LAB
CHOLEST SERPL-MCNC: 248 MG/DL
FASTING STATUS PATIENT QL REPORTED: ABNORMAL
FASTING STATUS PATIENT QL REPORTED: ABNORMAL
GLUCOSE SERPL-MCNC: 100 MG/DL (ref 70–99)
HDLC SERPL-MCNC: 78 MG/DL
LDLC SERPL CALC-MCNC: 149 MG/DL
NONHDLC SERPL-MCNC: 170 MG/DL
TRIGL SERPL-MCNC: 105 MG/DL

## 2024-11-13 PROCEDURE — 82947 ASSAY GLUCOSE BLOOD QUANT: CPT | Mod: QW | Performed by: FAMILY MEDICINE

## 2024-11-13 PROCEDURE — 90471 IMMUNIZATION ADMIN: CPT | Performed by: FAMILY MEDICINE

## 2024-11-13 PROCEDURE — 99213 OFFICE O/P EST LOW 20 MIN: CPT | Mod: 25 | Performed by: FAMILY MEDICINE

## 2024-11-13 PROCEDURE — 90656 IIV3 VACC NO PRSV 0.5 ML IM: CPT | Performed by: FAMILY MEDICINE

## 2024-11-13 PROCEDURE — 36415 COLL VENOUS BLD VENIPUNCTURE: CPT | Performed by: FAMILY MEDICINE

## 2024-11-13 PROCEDURE — 80061 LIPID PANEL: CPT | Performed by: FAMILY MEDICINE

## 2024-11-13 PROCEDURE — 99395 PREV VISIT EST AGE 18-39: CPT | Mod: 25 | Performed by: FAMILY MEDICINE

## 2024-11-13 PROCEDURE — 91320 SARSCV2 VAC 30MCG TRS-SUC IM: CPT | Performed by: FAMILY MEDICINE

## 2024-11-13 PROCEDURE — 90480 ADMN SARSCOV2 VAC 1/ONLY CMP: CPT | Performed by: FAMILY MEDICINE

## 2024-11-13 RX ORDER — ALBUTEROL SULFATE 90 UG/1
2 INHALANT RESPIRATORY (INHALATION) EVERY 6 HOURS PRN
Qty: 18 G | Refills: 1 | Status: SHIPPED | OUTPATIENT
Start: 2024-11-13

## 2024-11-13 NOTE — PROGRESS NOTES
"Preventive Care Visit  [facility]  King Leon MD, Family Medicine  Nov 13, 2024      Assessment & Plan     Healthcare maintenance  We have discussed health maintenance, routine follow-up, immunizations, heart healthy diet, regular activity, weight maintenance.    Encounter for biometric screening    Mild persistent asthma without complication  Controlled, continue current medication  - albuterol (PROAIR HFA/PROVENTIL HFA/VENTOLIN HFA) 108 (90 Base) MCG/ACT inhaler; Inhale 2 puffs into the lungs every 6 hours as needed for wheezing.    Lipid screening  - Lipid panel reflex to direct LDL Fasting; Future    Screening for diabetes mellitus  - Glucose; Future    Need for vaccination  - INFLUENZA VACCINE,SPLIT VIRUS,TRIVALENT,PF(FLUZONE)  - COVID-19 12+ (PFIZER)            BMI  Estimated body mass index is 27.85 kg/m  as calculated from the following:    Height as of this encounter: 1.791 m (5' 10.5\").    Weight as of this encounter: 89.3 kg (196 lb 14.4 oz).       Counseling  Appropriate preventive services were addressed with this patient via screening, questionnaire, or discussion as appropriate for fall prevention, nutrition, physical activity, Tobacco-use cessation, social engagement, weight loss and cognition.  Checklist reviewing preventive services available has been given to the patient.  Reviewed patient's diet, addressing concerns and/or questions.           Victor Manuel Bateman is a 34 year old, presenting for the following:  Physical (Annual px)        11/13/2024     9:43 AM   Additional Questions   Roomed by Ursula SALDANA CMA   Accompanied by Self         11/13/2024   Forms   Any forms needing to be completed Yes             HPI  Patient is here for health maintenance and biometric screening.  His numbers showed elevated LDL last year.  He has been working on a healthier diet.  He has no other concerns today.  His asthma is under control with ICS/LABA and rescue " inhaler.        Health Care Directive  Patient does not have a Health Care Directive:       11/12/2024   General Health   How would you rate your overall physical health? Good   Feel stress (tense, anxious, or unable to sleep) Only a little      (!) STRESS CONCERN      11/12/2024   Nutrition   Three or more servings of calcium each day? Yes   Diet: Regular (no restrictions)   How many servings of fruit and vegetables per day? (!) 2-3   How many sweetened beverages each day? 0-1            11/12/2024   Exercise   Days per week of moderate/strenous exercise 5 days   Average minutes spent exercising at this level 60 min            11/12/2024   Social Factors   Frequency of gathering with friends or relatives Twice a week   Worry food won't last until get money to buy more No   Food not last or not have enough money for food? No   Do you have housing? (Housing is defined as stable permanent housing and does not include staying ouside in a car, in a tent, in an abandoned building, in an overnight shelter, or couch-surfing.) Yes   Are you worried about losing your housing? No   Lack of transportation? No   Unable to get utilities (heat,electricity)? No            11/12/2024   Dental   Dentist two times every year? Yes            11/12/2024   TB Screening   Were you born outside of the [place]? No            Today's PHQ-2 Score:       11/12/2024    10:41 PM   PHQ-2 ( 1999 [employer])   Q1: Little interest or pleasure in doing things 0    Q2: Feeling down, depressed or hopeless 0    PHQ-2 Score 0    Q1: Little interest or pleasure in doing things Not at all   Q2: Feeling down, depressed or hopeless Not at all   PHQ-2 Score 0       Patient-reported           11/12/2024   Substance Use   Alcohol more than 3/day or more than 7/wk No   Do you use any other substances recreationally? No        Social History     Tobacco Use    Smoking status: Never     Passive exposure: Never    Smokeless tobacco: Never   Vaping Use    Vaping status:  "Never Used   Substance Use Topics    Alcohol use: Yes    Drug use: Never             11/12/2024   One time HIV Screening   Previous HIV test? No          11/12/2024   STI Screening   New sexual partner(s) since last STI/HIV test? No            11/12/2024   Contraception/Family Planning   Questions about contraception or family planning No           Reviewed and updated as needed this visit by Provider                    Past Medical History:   Diagnosis Date    Uncomplicated asthma      No past surgical history on file.      Review of Systems  Constitutional, neuro, ENT, endocrine, pulmonary, cardiac, gastrointestinal, genitourinary, musculoskeletal, integument and psychiatric systems are negative, except as otherwise noted.     Objective    Exam  /76 (BP Location: Right arm, Patient Position: Sitting, Cuff Size: Adult Large)   Pulse 58   Temp 96.9  F (36.1  C) (Tympanic)   Resp 16   Ht 1.791 m (5' 10.5\")   Wt 89.3 kg (196 lb 14.4 oz)   SpO2 98%   BMI 27.85 kg/m     Estimated body mass index is 27.85 kg/m  as calculated from the following:    Height as of this encounter: 1.791 m (5' 10.5\").    Weight as of this encounter: 89.3 kg (196 lb 14.4 oz).    Physical Exam  GENERAL: alert and no distress  EYES: Eyes grossly normal to inspection, PERRL and conjunctivae and sclerae normal  HENT: ear canals and TM's normal, nose and mouth without ulcers or lesions  NECK: no adenopathy, no asymmetry, masses, or scars  RESP: lungs clear to auscultation - no rales, rhonchi or wheezes  CV: regular rate and rhythm, normal S1 S2, no S3 or S4, no murmur, click or rub, no peripheral edema  ABDOMEN: soft, nontender, no hepatosplenomegaly, no masses and bowel sounds normal  MS: no gross musculoskeletal defects noted, no edema  SKIN: no suspicious lesions or rashes  NEURO: Normal strength and tone, mentation intact and speech normal  PSYCH: mentation appears normal, affect normal/bright        Signed Electronically by: " King Leon MD

## 2025-02-09 DIAGNOSIS — J45.30 MILD PERSISTENT ASTHMA WITHOUT COMPLICATION: ICD-10-CM

## 2025-02-10 RX ORDER — ALBUTEROL SULFATE 90 UG/1
2 INHALANT RESPIRATORY (INHALATION) EVERY 6 HOURS PRN
Qty: 6.7 G | Refills: 2 | Status: SHIPPED | OUTPATIENT
Start: 2025-02-10